# Patient Record
Sex: MALE | Race: WHITE | NOT HISPANIC OR LATINO | Employment: FULL TIME | URBAN - METROPOLITAN AREA
[De-identification: names, ages, dates, MRNs, and addresses within clinical notes are randomized per-mention and may not be internally consistent; named-entity substitution may affect disease eponyms.]

---

## 2023-06-17 ENCOUNTER — WALK IN (OUTPATIENT)
Dept: URGENT CARE | Age: 48
End: 2023-06-17

## 2023-06-17 ENCOUNTER — TELEPHONE (OUTPATIENT)
Dept: URGENT CARE | Age: 48
End: 2023-06-17

## 2023-06-17 VITALS
TEMPERATURE: 98.1 F | SYSTOLIC BLOOD PRESSURE: 138 MMHG | BODY MASS INDEX: 29.55 KG/M2 | WEIGHT: 195 LBS | DIASTOLIC BLOOD PRESSURE: 82 MMHG | HEART RATE: 61 BPM | HEIGHT: 68 IN | OXYGEN SATURATION: 97 % | RESPIRATION RATE: 16 BRPM

## 2023-06-17 DIAGNOSIS — S16.1XXA STRAIN OF NECK MUSCLE, INITIAL ENCOUNTER: Primary | ICD-10-CM

## 2023-06-17 PROCEDURE — L0120 CERV FLEX N/ADJ FOAM PRE OTS: HCPCS | Performed by: FAMILY MEDICINE

## 2023-06-17 PROCEDURE — 99203 OFFICE O/P NEW LOW 30 MIN: CPT | Performed by: FAMILY MEDICINE

## 2023-06-17 RX ORDER — CYCLOBENZAPRINE HCL 10 MG
10 TABLET ORAL 3 TIMES DAILY PRN
Qty: 15 TABLET | Refills: 0 | Status: SHIPPED | OUTPATIENT
Start: 2023-06-17

## 2023-06-17 RX ORDER — FLUTICASONE PROPIONATE 50 MCG
SPRAY, SUSPENSION (ML) NASAL
COMMUNITY
Start: 2022-11-21 | End: 2023-11-22

## 2023-06-17 RX ORDER — LORATADINE 10 MG/1
TABLET ORAL
COMMUNITY
Start: 2022-11-21 | End: 2023-11-22

## 2023-06-17 RX ORDER — ACETAMINOPHEN 500 MG
1000 TABLET ORAL ONCE
Status: COMPLETED | OUTPATIENT
Start: 2023-06-17 | End: 2023-06-17

## 2023-06-17 RX ORDER — CYCLOBENZAPRINE HCL 10 MG
10 TABLET ORAL 3 TIMES DAILY PRN
Qty: 15 TABLET | Refills: 0 | Status: SHIPPED | OUTPATIENT
Start: 2023-06-17 | End: 2023-06-17 | Stop reason: SDUPTHER

## 2023-06-17 RX ADMIN — Medication 1000 MG: at 08:38

## 2023-06-17 ASSESSMENT — PAIN SCALES - GENERAL: PAINLEVEL: 6

## 2024-02-14 ENCOUNTER — APPOINTMENT (OUTPATIENT)
Dept: GENERAL RADIOLOGY | Facility: CLINIC | Age: 49
End: 2024-02-14
Attending: EMERGENCY MEDICINE
Payer: OTHER GOVERNMENT

## 2024-02-14 ENCOUNTER — HOSPITAL ENCOUNTER (EMERGENCY)
Facility: CLINIC | Age: 49
Discharge: HOME | End: 2024-02-14
Attending: EMERGENCY MEDICINE | Admitting: EMERGENCY MEDICINE
Payer: OTHER GOVERNMENT

## 2024-02-14 VITALS
RESPIRATION RATE: 16 BRPM | WEIGHT: 195 LBS | TEMPERATURE: 97.9 F | HEIGHT: 68 IN | BODY MASS INDEX: 29.55 KG/M2 | OXYGEN SATURATION: 99 % | DIASTOLIC BLOOD PRESSURE: 86 MMHG | SYSTOLIC BLOOD PRESSURE: 140 MMHG | HEART RATE: 69 BPM

## 2024-02-14 DIAGNOSIS — R07.9 CHEST PAIN, UNSPECIFIED TYPE: ICD-10-CM

## 2024-02-14 DIAGNOSIS — R55 NEAR SYNCOPE: ICD-10-CM

## 2024-02-14 LAB
ALBUMIN SERPL BCG-MCNC: 4.7 G/DL (ref 3.5–5.2)
ALP SERPL-CCNC: 57 U/L (ref 40–150)
ALT SERPL W P-5'-P-CCNC: 24 U/L (ref 0–70)
ANION GAP SERPL CALCULATED.3IONS-SCNC: 11 MMOL/L (ref 7–15)
AST SERPL W P-5'-P-CCNC: 23 U/L (ref 0–45)
ATRIAL RATE - MUSE: 66 BPM
BILIRUB DIRECT SERPL-MCNC: <0.2 MG/DL (ref 0–0.3)
BILIRUB SERPL-MCNC: 0.4 MG/DL
BUN SERPL-MCNC: 12.6 MG/DL (ref 6–20)
CALCIUM SERPL-MCNC: 9.1 MG/DL (ref 8.6–10)
CHLORIDE SERPL-SCNC: 102 MMOL/L (ref 98–107)
CREAT SERPL-MCNC: 0.8 MG/DL (ref 0.67–1.17)
D DIMER PPP FEU-MCNC: <0.27 UG/ML FEU (ref 0–0.5)
DEPRECATED HCO3 PLAS-SCNC: 26 MMOL/L (ref 22–29)
DIASTOLIC BLOOD PRESSURE - MUSE: NORMAL MMHG
EGFRCR SERPLBLD CKD-EPI 2021: >90 ML/MIN/1.73M2
ERYTHROCYTE [DISTWIDTH] IN BLOOD BY AUTOMATED COUNT: 12.6 % (ref 10–15)
GLUCOSE SERPL-MCNC: 128 MG/DL (ref 70–99)
HCT VFR BLD AUTO: 38.8 % (ref 40–53)
HGB BLD-MCNC: 13.4 G/DL (ref 13.3–17.7)
HOLD SPECIMEN: NORMAL
INTERPRETATION ECG - MUSE: NORMAL
MCH RBC QN AUTO: 30.9 PG (ref 26.5–33)
MCHC RBC AUTO-ENTMCNC: 34.5 G/DL (ref 31.5–36.5)
MCV RBC AUTO: 90 FL (ref 78–100)
P AXIS - MUSE: 49 DEGREES
PLATELET # BLD AUTO: 267 10E3/UL (ref 150–450)
POTASSIUM SERPL-SCNC: 3.8 MMOL/L (ref 3.4–5.3)
PR INTERVAL - MUSE: 160 MS
PROT SERPL-MCNC: 7.8 G/DL (ref 6.4–8.3)
QRS DURATION - MUSE: 76 MS
QT - MUSE: 410 MS
QTC - MUSE: 429 MS
R AXIS - MUSE: 81 DEGREES
RBC # BLD AUTO: 4.33 10E6/UL (ref 4.4–5.9)
SODIUM SERPL-SCNC: 139 MMOL/L (ref 135–145)
SYSTOLIC BLOOD PRESSURE - MUSE: NORMAL MMHG
T AXIS - MUSE: 63 DEGREES
TROPONIN T SERPL HS-MCNC: <6 NG/L
VENTRICULAR RATE- MUSE: 66 BPM
WBC # BLD AUTO: 5.6 10E3/UL (ref 4–11)

## 2024-02-14 PROCEDURE — 36415 COLL VENOUS BLD VENIPUNCTURE: CPT | Performed by: EMERGENCY MEDICINE

## 2024-02-14 PROCEDURE — 99285 EMERGENCY DEPT VISIT HI MDM: CPT | Mod: 25

## 2024-02-14 PROCEDURE — 71046 X-RAY EXAM CHEST 2 VIEWS: CPT

## 2024-02-14 PROCEDURE — 85379 FIBRIN DEGRADATION QUANT: CPT | Performed by: EMERGENCY MEDICINE

## 2024-02-14 PROCEDURE — 93005 ELECTROCARDIOGRAM TRACING: CPT

## 2024-02-14 PROCEDURE — 80048 BASIC METABOLIC PNL TOTAL CA: CPT | Performed by: EMERGENCY MEDICINE

## 2024-02-14 PROCEDURE — 82040 ASSAY OF SERUM ALBUMIN: CPT | Performed by: EMERGENCY MEDICINE

## 2024-02-14 PROCEDURE — 96360 HYDRATION IV INFUSION INIT: CPT

## 2024-02-14 PROCEDURE — 84484 ASSAY OF TROPONIN QUANT: CPT | Performed by: EMERGENCY MEDICINE

## 2024-02-14 PROCEDURE — 258N000003 HC RX IP 258 OP 636: Performed by: EMERGENCY MEDICINE

## 2024-02-14 PROCEDURE — 85027 COMPLETE CBC AUTOMATED: CPT | Performed by: EMERGENCY MEDICINE

## 2024-02-14 PROCEDURE — 80053 COMPREHEN METABOLIC PANEL: CPT | Performed by: EMERGENCY MEDICINE

## 2024-02-14 RX ADMIN — SODIUM CHLORIDE 1000 ML: 9 INJECTION, SOLUTION INTRAVENOUS at 17:20

## 2024-02-14 ASSESSMENT — ACTIVITIES OF DAILY LIVING (ADL): ADLS_ACUITY_SCORE: 35

## 2024-02-14 NOTE — ED PROVIDER NOTES
"  History     Chief Complaint:  Chest Pain and Dizziness       HPI   Chaz Ruiz is a 48 year old male is brought to the emergency department via paramedics at 3:49 PM on a Wednesday afternoon.  The patient is a  for [employer].  He flew [facility] the [facility] today.  [facility] up and sustained near syncopal symptoms.  Following those symptoms he developed chest pain that lasted for \"few seconds \".  He is currently chest pain-free.  No history of similar symptoms.  No recent history of vomiting or diarrhea.  No medication changes.  No new herbal supplements.      Independent Historian:   None - Patient Only    Review of External Notes:   I reviewed an urgent care note from 2023 from [place], [place].    Medications:    Medication Details Route Status Patient Instructions Prescription Expires Prescription Number Last Dispense Date Ordering Provider Order Date Source   FLONASE-OTC(BRAND) 50 MCG CALI SPSN [9.9] USE 2 SPRAYS IN EACH NOSTRIL TWICE A DAY AS NEEDED FOR NASAL SYMPTOMS      2023 [id number] 2022 [facility] R 2023 [facility]   FLUTICASONE PROPIONATE 50MCG/SPRAY SOLN,NASAL,16GM USE 2 SPRAYS IN EACH NOSTRIL TWICE A DAY AS NEEDED FOR NASAL SYMPTOMS NASAL    2023 [id number] [facility]   IBUPROFEN (U/D) 400 MG ORAL TAB TAKE ONE TABLET BY MOUTH THREE TIMES A DAY - FOR PAIN - TAKE WITH FOOD      2022 [id number] 2022 [facility] R 2023 [facility]   IBUPROFEN 400MG TAB TAKE ONE TABLET BY MOUTH THREE TIMES A DAY - FOR PAIN - TAKE WITH FOOD ORAL    2022 [id number] 2022 [facility]   Loratadine (Alavert ODT) Tablet 10 mg Oral TAKE ONE TABLET BY MOUTH EVERY DAY FOR ALLERGY SYMPTOMS      2023 [id number] 2022 [facility] 2023 [facility]   Zara" (Alavert ODT) Tablet 10 mg Oral TAKE ONE TABLET BY MOUTH EVERY DAY FOR ALLERGY SYMPTOMS      2023 [id number] 2022 [facility] R 2023 [facility]   LORATADINE 10MG TAB TAKE ONE TABLET BY MOUTH EVERY DAY FOR ALLERGY SYMPTOMS ORAL    2023 [id number] 2022 [facility] R 2022 [facility]         Past Medical History:    Problem Status Onset Date Problem Type Date of Resolution Comments Source   plantar fasciitis Active 2015 Condition     [facility]   otitis media Inactive 2015 Condition     [facility]   tympanic membrane perforation right ear Inactive 2015 Condition     [facility]   upper respiratory infection acute Inactive 2012 Condition     [facility]   upper respiratory infection Inactive 2009 Condition     [facility]   visit for: issue medical certificate Inactive 2009 Condition     [facility]   Allergic rhinitis Active   Condition     [facility]   Ankle pain Active   Condition     [facility]   Foot pain Active   Condition     [facility]   Hearing loss Active   Condition     [facility]   Shoulder pain Active   Condition     [facility]   Tinnitus Active   Condition     [facility]   rhinitis chronic Active   Condition     [facility]   open wound of the hand left Active   Condition     [facility]   otitis media acute serous right ear Inactive   Condition     [facility]   tympanic membrane perforation Active   Condition     [facility]   viral syndrome Inactive   Condition     [facility]   influenza Inactive   Condition     [facility]   otitis media acute serous Inactive   Condition     [facility]   conductive hearing loss right ear Active   Condition     [facility]   Administrative Evaluation Services Inactive   Condition     [facility]   visit for: laboratory Inactive   Condition     [facility]   visit for:  services physical pre-deployment Active   Condition     [facility]   allergic rhinitis Active   Condition     [facility]   eustachian tube dysfunction right ear Active   Condition     [facility]   Patient Education -  Medication Active   Condition     [facility]   visit for: administrative purpose Active   Condition     [facility]   Need For Prophylactic Antibiotics Inactive   Condition     [facility]   mismatch of sleep / wake schedule with lifestyle needs Active   Condition     [facility]   Laboratory Studies Inactive   Condition     [facility]   common cold Inactive   Condition     [facility]   fever [as symptom] Active   Condition     [facility]   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   Condition     [facility]   Need For Prophylactic Measure Inactive   Condition     [facility]   ankle joint pain Active   Condition     [facility]   current smoker Active   Condition     [facility]   tendonitis Achilles right Active   Condition     [facility]   Guidance: Concerns About Tobacco Use Active   Condition     [facility]   tendonitis Achilles left Active   Condition     [facility]   Other Physical Therapy Active   Condition     [facility]   tendonitis Achilles Active   Condition     [facility]   ankle strain Achilles tendon left Active   Condition     [facility]   leg strain gastrocnemius left Active   Condition     [facility]   pharyngitis Active   Condition     [facility]   posterior vitreous detachment Active   Condition     [facility]   tobacco use Active   Condition     [facility]   eczema Inactive   Condition     [facility]   vision problems Inactive   Condition     [facility]   joint pain, localized in the shoulder Active   Condition     [facility]   visit for:  services physical Active   Condition   1- Comlpeted 2766 review of chart. 2- Completed 2795 for pre-deployment clearance. 3- Member is FFD and WWQ. 4- Injury and health promotion discussed. 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 1042 updated. FFD. [facility]   atopic dermatitis Active   Condition   Resolved. - RTFS. [facility]   Patient Education - Injury Prevention Inactive   Condition   Safety at work/home/sports. [facility]   Patient Education Dietary Inactive   Condition   Eat healthy meals. [facility]   skin disorder exanthem Active    "Condition   31 Y/O MALE WITH RASH ON HANDS. DOES NOT INTERFERE WITH FLYING. NO DNIF REQUIRED. [facility]   Blood Chemistry Abnormal Active   Condition   31 Y/O MALE WITH ELEVATED CHOLESTEROL. WILL RE-CHECK IN ABOUT 6 MONTHS AFTER TLC. NO DNIF REQUIRED AT THIS TIME. [facility]   hearing loss Active   Condition   PATIENT HAS A +STS ON THE RIGHT ALSO HAS A PRETTY SIGNIFICANT EFFUSION BEHIND THE RIGHT TM. WILL GIVE FLONASE TO HELP CLEAR AND WILL RE-TEST AFTER EFFUSION HAS RESOLVED. [facility]   visit for: occupational health / fitness exam Inactive   Condition   31 Y/O MALE HERE FOR ANNUAL PHA. NO NEW MEDICAL CONCERNS. PATIENT WWQ WITH NO RESTRICTIONS  SIGNED. 1042 GIVEN/SIGNED. [facility]   Diagnosis: ICD-10-CM M79.673 Pain in unspecified foot Active   Diagnosis     [facility]   Diagnosis: ICD-10-CM M25.579 Pain in unspecified ankle and joints of unspecified foot Active   Diagnosis     [facility]   Diagnosis: ICD-10-CM M21.40 Flat foot [pes planus] (acquired), unspecified foot Active   Diagnosis     [facility]   Diagnosis: ICD-10-CM Q66.50 Congenital pes planus, unspecified foot Active   Diagnosis     [facility]   Diagnosis: ICD-10-CM Z71.89 Other specified counseling Active   Diagnosis     [facility]         Past Surgical History:    No past surgical history on file.     Physical Exam   Patient Vitals for the past 24 hrs:   BP Temp Temp src Pulse Resp SpO2 Height Weight   02/14/24 1555 (!) 152/91 97.9  F (36.6  C) Oral 78 16 98 % 1.727 m (5' 8\") 88.5 kg (195 lb)        Physical Exam  General: Alert, No distress. Nontoxic appearance  Head: No signs of trauma.   Mouth/Throat: Oropharynx moist.   Eyes: Conjunctivae are normal. Pupils are equal..   Neck: Normal range of motion.    CV: Appears well perfused.  Regular rate and rhythm with no murmurs rubs or gallops.  Resp:No respiratory distress.  Lungs are clear to auscultation bilaterally no wheezing no crackles.  MSK: Normal range of motion. No obvious deformity.   Neuro: " The patient is alert and interactive. RAMOS. Speech normal. GCS 15  Skin: No lesion or sign of trauma noted.   Psych: normal mood and affect. behavior is normal.       Emergency Department Course   ECG  ECG results from 02/14/24   EKG 12-lead, tracing only     Value    Systolic Blood Pressure     Diastolic Blood Pressure     Ventricular Rate 66    Atrial Rate 66    CT Interval 160    QRS Duration 76        QTc 429    P Axis 49    R AXIS 81    T Axis 63    Interpretation ECG      Sinus rhythm  Possible Left atrial enlargement  Borderline ECG  No previous ECGs available  Confirmed by GENERATED REPORT, COMPUTER ([id number]),  Aleyda Jensen ([id number]) on 2/14/2024 5:29:18 PM           Imaging:  XR Chest 2 Views   Final Result   IMPRESSION: Negative chest.             Laboratory:  Labs Ordered and Resulted from Time of ED Arrival to Time of ED Departure   CBC WITH PLATELETS - Abnormal       Result Value    WBC Count 5.6      RBC Count 4.33 (*)     Hemoglobin 13.4      Hematocrit 38.8 (*)     MCV 90      MCH 30.9      MCHC 34.5      RDW 12.6      Platelet Count 267     BASIC METABOLIC PANEL - Abnormal    Sodium 139      Potassium 3.8      Chloride 102      Carbon Dioxide (CO2) 26      Anion Gap 11      Urea Nitrogen 12.6      Creatinine 0.80      GFR Estimate >90      Calcium 9.1      Glucose 128 (*)    TROPONIN T, HIGH SENSITIVITY - Normal    Troponin T, High Sensitivity <6     D DIMER QUANTITATIVE - Normal    D-Dimer Quantitative <0.27     HEPATIC FUNCTION PANEL - Normal    Protein Total 7.8      Albumin 4.7      Bilirubin Total 0.4      Alkaline Phosphatase 57      AST 23      ALT 24      Bilirubin Direct <0.20            Emergency Department Course & Assessments:    Interventions:  Medications   sodium chloride 0.9% BOLUS 1,000 mL (0 mLs Intravenous Stopped 2/14/24 0940)        Assessments:  Patient was assessed upon arrival in the ED and several times thereafter.    Independent Interpretation (X-rays, CTs, rhythm  strip):  Chest x-ray shows no evidence of pneumothorax or cardiomegaly.    Consultations/Discussion of Management or Tests:  None     Social Determinants of Health affecting care:   Stress/Adjustment Disorders    Disposition:  The patient was discharged.     Impression & Plan        Medical Decision Making:  Chaz Ruiz is a 48 year old male who presents for evaluation of near-syncope and chest pain.  He did not have actual syncope.  The differential for near-syncope is broad and includes etiologies such as cardiac arrythmia, ACS, aortic stenosis, HOCM, PE, orthostatic hypotension, drugs, situational, carotid hypersensitivity, seizure, TIA, stroke, vasovagal.  There are no signs of a concerning etiology for near- syncope at this point.  In addition, there is no family history of sudden death, no chest pain, no seizure activity or post-ictal period, no murmur, and no signs of orthostasis in the ED, no focal neurologic symptoms, and no complaints of concerning headache.  The workup in the ED is negative and the physical exam is re-assurring.      In addition, the patient's near syncopal episode was followed by a brief period of chest pain.  Fortunately the workup in the ED has been unremarkable and at this time I am not concerned for ACS. The EKG shows no acute ischemic changes. The troponin is negative . Based on the [facility] high sensitivity troponin pathway, this should rule the patient out for myocardial injury    I considered other possible causes of chest pain including PE (negative d-dimer), infection, pneumothorax, aortic dissection, and even more benign causes such as reflux and esophageal motility issues. The physical exam, laboratory, and radiological findings listed above make life threatening conditions less likely. At this time I believe the patient is safe for discharge. I have encouraged close outpatient follow up.     Diagnosis:    ICD-10-CM    1. Near syncope  R55       2. Chest  pain, unspecified type  R07.9            2/14/2024   Alex Moura MD Joing, Todd Roger, MD  02/15/24 [id number]

## 2024-02-14 NOTE — ED TRIAGE NOTES
Patient BIBA from the airport. Patient is a  and fly in from [place] today. Upon arrival patient went to stand to gather his belongings and had sudden onset of dizziness and heart palpitations that last 5 mins. Shortly after patient had left sided chest pain. For EMS bp sbp 190 and EMS gave 324 ASA. Patient denies symptoms upon arrival.      Triage Assessment (Adult)       Row Name 02/14/24 1557          Triage Assessment    Airway WDL WDL        Respiratory WDL    Respiratory WDL WDL        Skin Circulation/Temperature WDL    Skin Circulation/Temperature WDL WDL        Cardiac WDL    Cardiac WDL WDL        Cognitive/Neuro/Behavioral WDL    Cognitive/Neuro/Behavioral WDL WDL        Martinez Coma Scale    Best Eye Response 4-->(E4) spontaneous     Best Motor Response 6-->(M6) obeys commands     Best Verbal Response 5-->(V5) oriented     Martinez Coma Scale Score 15

## 2024-03-10 ENCOUNTER — HEALTH MAINTENANCE LETTER (OUTPATIENT)
Age: 49
End: 2024-03-10

## 2025-02-08 ENCOUNTER — WALK IN (OUTPATIENT)
Dept: URGENT CARE | Age: 50
End: 2025-02-08

## 2025-02-08 VITALS
RESPIRATION RATE: 18 BRPM | BODY MASS INDEX: 27.58 KG/M2 | HEIGHT: 68 IN | WEIGHT: 182 LBS | TEMPERATURE: 98.9 F | SYSTOLIC BLOOD PRESSURE: 131 MMHG | DIASTOLIC BLOOD PRESSURE: 84 MMHG | OXYGEN SATURATION: 97 % | HEART RATE: 89 BPM

## 2025-02-08 DIAGNOSIS — B34.9 VIRAL SYNDROME: Primary | ICD-10-CM

## 2025-02-08 DIAGNOSIS — J34.89 SINUS PRESSURE: ICD-10-CM

## 2025-02-08 LAB
FLUAV RNA RESP QL NAA+PROBE: NOT DETECTED
FLUBV RNA RESP QL NAA+PROBE: NOT DETECTED
RSV AG NPH QL IA.RAPID: NOT DETECTED
SARS-COV-2 RNA RESP QL NAA+PROBE: NOT DETECTED

## 2025-02-08 RX ORDER — BENZONATATE 200 MG/1
200 CAPSULE ORAL 3 TIMES DAILY PRN
Qty: 30 CAPSULE | Refills: 0 | Status: SHIPPED | OUTPATIENT
Start: 2025-02-08

## 2025-02-08 RX ORDER — FLUTICASONE PROPIONATE 50 MCG
2 SPRAY, SUSPENSION (ML) NASAL DAILY
Qty: 9.9 ML | Refills: 0 | Status: SHIPPED | OUTPATIENT
Start: 2025-02-08 | End: 2025-03-10

## 2025-02-08 ASSESSMENT — ENCOUNTER SYMPTOMS
FATIGUE: 1
APPETITE CHANGE: 1
COUGH: 1
SHORTNESS OF BREATH: 0
ABDOMINAL PAIN: 0
FEVER: 0
CHILLS: 0
SINUS PRESSURE: 1
HEADACHES: 1
SORE THROAT: 1
ACTIVITY CHANGE: 1

## 2025-03-16 ENCOUNTER — HEALTH MAINTENANCE LETTER (OUTPATIENT)
Age: 50
End: 2025-03-16